# Patient Record
Sex: FEMALE | Race: WHITE | NOT HISPANIC OR LATINO | Employment: FULL TIME | URBAN - METROPOLITAN AREA
[De-identification: names, ages, dates, MRNs, and addresses within clinical notes are randomized per-mention and may not be internally consistent; named-entity substitution may affect disease eponyms.]

---

## 2023-03-14 ENCOUNTER — OFFICE VISIT (OUTPATIENT)
Dept: PRIMARY CARE | Facility: CLINIC | Age: 33
End: 2023-03-14
Payer: COMMERCIAL

## 2023-03-14 VITALS
HEIGHT: 64 IN | WEIGHT: 246.8 LBS | HEART RATE: 88 BPM | OXYGEN SATURATION: 98 % | DIASTOLIC BLOOD PRESSURE: 82 MMHG | BODY MASS INDEX: 42.14 KG/M2 | SYSTOLIC BLOOD PRESSURE: 108 MMHG

## 2023-03-14 DIAGNOSIS — R19.4 CHANGE IN BOWEL HABIT: ICD-10-CM

## 2023-03-14 DIAGNOSIS — Z86.39 HISTORY OF HYPOTHYROIDISM: ICD-10-CM

## 2023-03-14 DIAGNOSIS — Z13.220 LIPID SCREENING: Primary | ICD-10-CM

## 2023-03-14 DIAGNOSIS — Z11.4 ENCOUNTER FOR SCREENING FOR HUMAN IMMUNODEFICIENCY VIRUS (HIV): ICD-10-CM

## 2023-03-14 DIAGNOSIS — Z11.59 ENCOUNTER FOR HEPATITIS C SCREENING TEST FOR LOW RISK PATIENT: ICD-10-CM

## 2023-03-14 DIAGNOSIS — Z86.19 H/O COLD SORES: ICD-10-CM

## 2023-03-14 PROCEDURE — 99214 OFFICE O/P EST MOD 30 MIN: CPT | Performed by: PHYSICIAN ASSISTANT

## 2023-03-14 PROCEDURE — 3008F BODY MASS INDEX DOCD: CPT | Performed by: PHYSICIAN ASSISTANT

## 2023-03-14 RX ORDER — VALACYCLOVIR HYDROCHLORIDE 500 MG/1
500 TABLET, FILM COATED ORAL DAILY
Qty: 90 TABLET | Refills: 0 | Status: SHIPPED | OUTPATIENT
Start: 2023-03-14 | End: 2024-02-26 | Stop reason: SDUPTHER

## 2023-03-14 RX ORDER — LEVONORGESTREL 52 MG/1
INTRAUTERINE DEVICE INTRAUTERINE
COMMUNITY

## 2023-03-14 ASSESSMENT — PATIENT HEALTH QUESTIONNAIRE - PHQ9
SUM OF ALL RESPONSES TO PHQ QUESTIONS 1-9: 4
SUM OF ALL RESPONSES TO PHQ9 QUESTIONS 1 & 2: 0

## 2023-03-14 NOTE — ASSESSMENT & PLAN NOTE
Previous history of, never able to get her levothyroxine right  Off meds for some time  Checking TSH with reflex T4

## 2023-03-14 NOTE — PROGRESS NOTES
I was immediately available to provide supervision and direction for the care of the patient.    Federica Manning DO

## 2023-03-14 NOTE — PROGRESS NOTES
[facility]      Reason for visit:   Chief Complaint   Patient presents with   • Establish Care     Hypothyroidism, cold sores more often, IBS concerns      Yudith Perez is a 32 y.o. female who presents for     Patient presents to establish care.  She has a few issues she would like to discuss   she has cold sores that were intermittent, seem to be occurring more frequently, would like to consider daily medications to avoid having the cold sores on her lips    She was diagnosed with hypothyroidism years ago, was put on medication, had to keep increasing the dose and that was she was taking appropriately never got to goal TSH.  So she just stopped all medications.  She reports that she has always been mildly constipated, and has had problems with weight and hair and skin and nails.    Patient also reports that generally she is constipated but has noticed over the last 6 months that she has had looser stools with some abdominal cramping.  She cannot localize any of the abdominal cramping, and she denies any bloody stools.  She has been working on changing her diet, has tried dairy free and gluten-free and has noticed some improvement.  Her only concern is that her mom has Crohn's disease and she is unsure if this could potentially be Crohn's            Past Medical History:   Diagnosis Date   • Hyperthyroidism        History reviewed. No pertinent surgical history.    Social History     Tobacco Use   • Smoking status: Never     Passive exposure: Never   • Smokeless tobacco: Never   Substance Use Topics   • Alcohol use: Yes     Alcohol/week: 1.0 - 2.0 standard drink of alcohol     Types: 1 - 2 Glasses of wine per week   • Drug use: Never       Family History   Problem Relation Age of Onset   • Crohn's disease Biological Mother    • Thyroid disease Biological Mother    • Hypertension Biological Father    • ADD / ADHD Biological Sister    • No Known Problems Maternal Grandfather    • Retinitis pigmentosa Paternal  "Grandmother    • Cancer Paternal Grandfather 93        unsure of type       Patient has no known allergies.      Current Outpatient Medications:   •  levonorgestreL (MIRENA) 18.6 mcg/24 hours (5 yrs) 52 mg intrauterine device, Mirena 21 mcg/24 hours (8 yrs) 52 mg intrauterine device  1 device inserted, Disp: , Rfl:   •  valACYclovir (VALTREX) 500 mg tablet, Take 1 tablet (500 mg total) by mouth daily., Disp: 90 tablet, Rfl: 0    Review of Systems   All other systems reviewed and are negative.      Objective     Wt Readings from Last 3 Encounters:   03/14/23 112 kg (246 lb 12.8 oz)       Temp Readings from Last 3 Encounters:   No data found for Temp       BP Readings from Last 3 Encounters:   03/14/23 108/82       Pulse Readings from Last 3 Encounters:   03/14/23 88     Vitals:    03/14/23 0811   BP: 108/82   BP Location: Left upper arm   Patient Position: Sitting   Pulse: 88   SpO2: 98%   Weight: 112 kg (246 lb 12.8 oz)   Height: 1.626 m (5' 4\")     Body mass index is 42.36 kg/m².    Physical Exam  Vitals and nursing note reviewed.   HENT:      Head: Normocephalic.      Right Ear: Tympanic membrane normal.      Left Ear: Tympanic membrane normal.      Nose: Nose normal.   Eyes:      Conjunctiva/sclera: Conjunctivae normal.      Pupils: Pupils are equal, round, and reactive to light.   Cardiovascular:      Rate and Rhythm: Normal rate and regular rhythm.      Pulses: Normal pulses.      Heart sounds: Normal heart sounds.   Pulmonary:      Effort: Pulmonary effort is normal.      Breath sounds: Normal breath sounds.   Abdominal:      General: Abdomen is flat. Bowel sounds are normal.      Palpations: Abdomen is soft.   Musculoskeletal:         General: Normal range of motion.      Cervical back: Normal range of motion.   Skin:     General: Skin is warm and dry.      Capillary Refill: Capillary refill takes less than 2 seconds.   Neurological:      General: No focal deficit present.      Mental Status: She is alert and " oriented to person, place, and time.   Psychiatric:         Mood and Affect: Mood normal.         Behavior: Behavior normal.         No results found for: WBC, HGB, HCT, PLT, CHOL, TRIG, HDL, LDLDIRECT, ALT, AST, NA, K, CL, CREATININE, BUN, CO2, TSH, PSA, INR, HGBA1C, MICROALBUR     PHQ 2 to 9:  Will the patient answer the depression questions?: Y    Little interest or pleasure in doing things: Not at all    Feeling down, depressed, or hopeless: Not at all    Depression Risk: 0         SANJAY-7  Feeling nervous, anxious or on edge: 1-->Several days        Not being able to stop or control worryin-->Several days    Worrying too much about different things: 1-->Several days    Trouble relaxin-->Not at all    Being so restless that it is hard to sit still: 1-->Several days    Becoming easily annoyed or irritable: 0-->Not at all    Feeling afraid as if something awful might happen: 0-->Not at all      GAD7 Total Score: : 4      If you checked off any problems, how difficult have these made it for you to do your work, take care of things at home, or get along with other people?: Not difficult at all                       Assessment   Problem List Items Addressed This Visit        Other    Change in bowel habit     No red flag symptoms, and her exam is normal, do not suspect inflammatory bowel  Encourage patient to do a full 2 weeks of dairy free then gluten-free dairy free does not work, encouraged her to do this after she gets her fasting blood work with a celiac panel  Follow-up here in 3 months for physical         Relevant Orders    CBC and differential    Sedimentation rate, automated    C-reactive protein    Celiac reflex panel    Lactoferrin, Quant, Stool    Calprotectin, Fecal    History of hypothyroidism     Previous history of, never able to get her levothyroxine right  Off meds for some time  Checking TSH with reflex T4         Relevant Orders    Celiac reflex panel    TSH w reflex FT4    Lactoferrin,  Quant, Stool    Calprotectin, Fecal    H/O cold sores     Used to be more intermittent, seem to be occurring more frequently  Starting suppressive medication         Relevant Medications    valACYclovir (VALTREX) 500 mg tablet   Other Visit Diagnoses     Lipid screening    -  Primary    Relevant Orders    Lipid panel    Comprehensive metabolic panel    Encounter for hepatitis C screening test for low risk patient        Relevant Orders    Hepatitis C antibody    Encounter for screening for human immunodeficiency virus (HIV)        Relevant Orders    HIV 1,2 AB P24 AG                This note was written with the assistance of voice recognition software, please excuse errors associated with voice recognition software.  FACUNDO Diana C  3/14/2023

## 2023-03-14 NOTE — PATIENT INSTRUCTIONS
After you get fasting blood work, do below  I want you to stop dairy for two weeks and if diarrhea and abdominal bloating resolve, you can either stay off dairy or you may add dairy ease prior to eating dairy    If after you have stopped dairy for two weeks and your symptoms have not resolved, stop gluten(wheat, rye and barley) for two weeks.  If symptoms resolve, you need to avoid gluten, there is no pill to take before eating gluten

## 2023-03-14 NOTE — ASSESSMENT & PLAN NOTE
Used to be more intermittent, seem to be occurring more frequently  Starting suppressive medication

## 2023-03-14 NOTE — ASSESSMENT & PLAN NOTE
No red flag symptoms, and her exam is normal, do not suspect inflammatory bowel  Encourage patient to do a full 2 weeks of dairy free then gluten-free dairy free does not work, encouraged her to do this after she gets her fasting blood work with a celiac panel  Follow-up here in 3 months for physical

## 2023-04-04 ENCOUNTER — TELEPHONE (OUTPATIENT)
Dept: PRIMARY CARE | Facility: CLINIC | Age: 33
End: 2023-04-04
Payer: COMMERCIAL

## 2023-04-04 DIAGNOSIS — E03.1 CONGENITAL HYPOTHYROIDISM WITHOUT GOITER: Primary | ICD-10-CM

## 2023-04-04 LAB
ALBUMIN SERPL-MCNC: 4.6 G/DL (ref 3.9–5)
ALBUMIN/GLOB SERPL: 1.7 {RATIO} (ref 1.2–2.2)
ALP SERPL-CCNC: 84 IU/L (ref 44–121)
ALT SERPL-CCNC: 52 IU/L (ref 0–32)
AST SERPL-CCNC: 30 IU/L (ref 0–40)
BASOPHILS # BLD AUTO: 0 X10E3/UL (ref 0–0.2)
BASOPHILS NFR BLD AUTO: 0 %
BILIRUB SERPL-MCNC: 0.4 MG/DL (ref 0–1.2)
BUN SERPL-MCNC: 12 MG/DL (ref 6–20)
BUN/CREAT SERPL: 18 (ref 9–23)
CALCIUM SERPL-MCNC: 9.5 MG/DL (ref 8.7–10.2)
CHLORIDE SERPL-SCNC: 101 MMOL/L (ref 96–106)
CHOLEST SERPL-MCNC: 206 MG/DL (ref 100–199)
CO2 SERPL-SCNC: 22 MMOL/L (ref 20–29)
CREAT SERPL-MCNC: 0.65 MG/DL (ref 0.57–1)
CRP SERPL-MCNC: 8 MG/L (ref 0–10)
EGFRCR SERPLBLD CKD-EPI 2021: 121 ML/MIN/1.73
ENDOMYSIUM IGA SER QL: NEGATIVE
EOSINOPHIL # BLD AUTO: 0.1 X10E3/UL (ref 0–0.4)
EOSINOPHIL NFR BLD AUTO: 2 %
ERYTHROCYTE [DISTWIDTH] IN BLOOD BY AUTOMATED COUNT: 12.7 % (ref 11.7–15.4)
ERYTHROCYTE [SEDIMENTATION RATE] IN BLOOD BY WESTERGREN METHOD: 45 MM/HR (ref 0–32)
GLOBULIN SER CALC-MCNC: 2.7 G/DL (ref 1.5–4.5)
GLUCOSE SERPL-MCNC: 93 MG/DL (ref 70–99)
HCT VFR BLD AUTO: 41.2 % (ref 34–46.6)
HCV IGG SERPL QL IA: NON REACTIVE
HDLC SERPL-MCNC: 38 MG/DL
HGB BLD-MCNC: 14.1 G/DL (ref 11.1–15.9)
HIV 1+2 AB+HIV1 P24 AG SERPL QL IA: NON REACTIVE
IGA SERPL-MCNC: 417 MG/DL (ref 87–352)
IMM GRANULOCYTES # BLD AUTO: 0 X10E3/UL (ref 0–0.1)
IMM GRANULOCYTES NFR BLD AUTO: 0 %
LDLC SERPL CALC-MCNC: 141 MG/DL (ref 0–99)
LYMPHOCYTES # BLD AUTO: 2.5 X10E3/UL (ref 0.7–3.1)
LYMPHOCYTES NFR BLD AUTO: 32 %
MCH RBC QN AUTO: 29.2 PG (ref 26.6–33)
MCHC RBC AUTO-ENTMCNC: 34.2 G/DL (ref 31.5–35.7)
MCV RBC AUTO: 85 FL (ref 79–97)
MONOCYTES # BLD AUTO: 0.5 X10E3/UL (ref 0.1–0.9)
MONOCYTES NFR BLD AUTO: 6 %
NEUTROPHILS # BLD AUTO: 4.7 X10E3/UL (ref 1.4–7)
NEUTROPHILS NFR BLD AUTO: 60 %
PLATELET # BLD AUTO: 319 X10E3/UL (ref 150–450)
POTASSIUM SERPL-SCNC: 4.2 MMOL/L (ref 3.5–5.2)
PROT SERPL-MCNC: 7.3 G/DL (ref 6–8.5)
RBC # BLD AUTO: 4.83 X10E6/UL (ref 3.77–5.28)
SODIUM SERPL-SCNC: 138 MMOL/L (ref 134–144)
SPECIMEN STATUS: NORMAL
T4 FREE SERPL-MCNC: 0.75 NG/DL (ref 0.82–1.77)
TRIGL SERPL-MCNC: 150 MG/DL (ref 0–149)
TSH SERPL DL<=0.005 MIU/L-ACNC: 20.9 UIU/ML (ref 0.45–4.5)
TTG IGA SER-ACNC: <2 U/ML (ref 0–3)
VLDLC SERPL CALC-MCNC: 27 MG/DL (ref 5–40)
WBC # BLD AUTO: 7.9 X10E3/UL (ref 3.4–10.8)

## 2023-04-04 RX ORDER — LEVOTHYROXINE SODIUM 25 UG/1
25 TABLET ORAL
Qty: 90 TABLET | Refills: 0 | Status: SHIPPED | OUTPATIENT
Start: 2023-04-04 | End: 2023-05-04

## 2023-04-04 NOTE — RESULT ENCOUNTER NOTE
CBC is normal  CMP with elevated ALT at 52  FLP with  and non-HDL is 168  HIV and hepatitis C are negative  TSH 20.9 with T4 0.75(not on synthroid)  Sed rate elevated, C-reactive protein is normal  Awaiting stool cultures for inflammatory bowel    Can you please call patient and let her know that her thyroid function is very underactive I am going to start her on a low-dose of thyroid medicine and after 2 weeks I would like her to increase that medication to 2 pills daily.  Make sure she is taking the thyroid medicine on an empty stomach 30 minutes prior to any other food drink or medications  Also let her know that her 1 inflammatory marker is elevated, I am awaiting her stool cultures.  Would like to have her follow-up sooner than June with these results so see if she can schedule in early May please  Ivy

## 2023-04-04 NOTE — TELEPHONE ENCOUNTER
----- Message from FACUNDO Diana sent at 4/4/2023 11:08 AM EDT -----  CBC is normal  CMP with elevated ALT at 52  FLP with  and non-HDL is 168  HIV and hepatitis C are negative  TSH 20.9 with T4 0.75(not on synthroid)  Sed rate elevated, C-reactive protein is normal  Awaiting stool cultures for inflammatory bowel    Can you please call patient and let her know that her thyroid function is very underactive I am going to start her on a low-dose of thyroid medicine and after 2 weeks I would like her to increase that medication to 2 pills daily.  Make sure she is taking the thyroid medicine on an empty stomach 30 minutes prior to any other food drink or medications  Also let her know that her 1 inflammatory marker is elevated, I am awaiting her stool cultures.  Would like to have her follow-up sooner than June with these results so see if she can schedule in early May please  Ivy

## 2023-04-04 NOTE — TELEPHONE ENCOUNTER
LMOM informing pt of message in detail. Asked pt to call back to discuss if she has any questions.

## 2023-04-08 LAB — CALPROTECTIN STL-MCNT: 19 UG/G (ref 0–120)

## 2023-04-12 LAB — LACTOFERRIN STL-MCNC: <1 UG/ML(G) (ref 0–7.24)

## 2023-05-04 ENCOUNTER — OFFICE VISIT (OUTPATIENT)
Dept: PRIMARY CARE | Facility: CLINIC | Age: 33
End: 2023-05-04
Payer: COMMERCIAL

## 2023-05-04 VITALS
OXYGEN SATURATION: 98 % | DIASTOLIC BLOOD PRESSURE: 68 MMHG | SYSTOLIC BLOOD PRESSURE: 122 MMHG | HEIGHT: 64 IN | BODY MASS INDEX: 41.38 KG/M2 | HEART RATE: 72 BPM | WEIGHT: 242.4 LBS

## 2023-05-04 DIAGNOSIS — R19.4 CHANGE IN BOWEL HABIT: ICD-10-CM

## 2023-05-04 DIAGNOSIS — Z86.39 HISTORY OF HYPOTHYROIDISM: Primary | ICD-10-CM

## 2023-05-04 DIAGNOSIS — R79.82 ELEVATED C-REACTIVE PROTEIN (CRP): ICD-10-CM

## 2023-05-04 DIAGNOSIS — Z00.00 PHYSICAL EXAM: ICD-10-CM

## 2023-05-04 DIAGNOSIS — R70.0 ELEVATED SED RATE: ICD-10-CM

## 2023-05-04 DIAGNOSIS — Z23 NEED FOR DIPHTHERIA-TETANUS-PERTUSSIS (TDAP) VACCINE: ICD-10-CM

## 2023-05-04 PROCEDURE — 90471 IMMUNIZATION ADMIN: CPT | Performed by: PHYSICIAN ASSISTANT

## 2023-05-04 PROCEDURE — 90715 TDAP VACCINE 7 YRS/> IM: CPT | Performed by: PHYSICIAN ASSISTANT

## 2023-05-04 PROCEDURE — 99395 PREV VISIT EST AGE 18-39: CPT | Mod: 25 | Performed by: PHYSICIAN ASSISTANT

## 2023-05-04 PROCEDURE — 3008F BODY MASS INDEX DOCD: CPT | Performed by: PHYSICIAN ASSISTANT

## 2023-05-04 RX ORDER — LEVOTHYROXINE SODIUM 75 UG/1
75 TABLET ORAL
Qty: 90 TABLET | Refills: 3 | Status: SHIPPED | OUTPATIENT
Start: 2023-05-04 | End: 2023-11-02

## 2023-05-04 NOTE — ASSESSMENT & PLAN NOTE
Performed today, patient is now up-to-date on immunizations  Labs reviewed today  Up-to-date on GYN care

## 2023-05-04 NOTE — ASSESSMENT & PLAN NOTE
TSH 20 with T4 of 0.75, was on no medications at that time, have slowly ramped her up from 25 mcg to 50 mcg, increasing her dose to 75 mcg today  Repeat TSH with reflex T4 in 4 weeks

## 2023-05-04 NOTE — PROGRESS NOTES
[facility] in [place] [address]  [facility] [address]  Phone: [phone]  Fax: [phone]       CHIEF COMPLAINT   Annual Physical Exam  Chief Complaint   Patient presents with   • Annual Exam        HISTORY OF PRESENT ILLNESS      This is a 32 y.o. female who presents for an annual physical exam.  Patient has started on her levothyroxine with her underactive thyroid labs, initially started at 25 mcg, increased to 50 mcg, reports she is feeling a little less fatigued but thought that that might just be due to the weather at this point.  Denies any other changes, still has weight gain.  His last visit patient has been working on a dairy then a gluten-free diet, noticed improvement both in her bloating and loose stools with cessation of dairy, but also wants to try to get rid of gluten and see if that will help as well.        PAST MEDICAL AND SURGICAL HISTORY      Past Medical History:   Diagnosis Date   • Hyperthyroidism        History reviewed. No pertinent surgical history.    MEDICATIONS        Current Outpatient Medications:   •  levonorgestreL (MIRENA) 18.6 mcg/24 hours (5 yrs) 52 mg intrauterine device, Mirena 21 mcg/24 hours (8 yrs) 52 mg intrauterine device  1 device inserted, Disp: , Rfl:   •  levothyroxine (SYNTHROID) 75 mcg tablet, Take 1 tablet (75 mcg total) by mouth daily., Disp: 90 tablet, Rfl: 3  •  valACYclovir (VALTREX) 500 mg tablet, Take 1 tablet (500 mg total) by mouth daily., Disp: 90 tablet, Rfl: 0    ALLERGIES      Patient has no known allergies.    FAMILY HISTORY      Family History   Problem Relation Age of Onset   • Crohn's disease Biological Mother    • Thyroid disease Biological Mother    • Hypertension Biological Father    • ADD / ADHD Biological Sister    • No Known Problems Maternal Grandfather    • Retinitis pigmentosa Paternal Grandmother    • Cancer Paternal Grandfather 93        unsure of type       SOCIAL HISTORY      Social History  "    Socioeconomic History   • Marital status: Single     Spouse name: None   • Number of children: None   • Years of education: None   • Highest education level: None   Occupational History   • Occupation:    Tobacco Use   • Smoking status: Never     Passive exposure: Never   • Smokeless tobacco: Never   Substance and Sexual Activity   • Alcohol use: Yes     Alcohol/week: 1.0 - 2.0 standard drink of alcohol     Types: 1 - 2 Glasses of wine per week   • Drug use: Never   • Sexual activity: Yes     Partners: Male     Birth control/protection: I.U.D.   Social History Narrative    Lives alone       Lives with: alone  IPV screen: safe  Employment:  Menses: started at 14 now no menstruation with IUD  Pregnancy history: G1  Diet: dairy free helped, inconsistent, working on portion sizes  Exercise: 1-2 days a week, walking  Sleep: 6-7  Stress:moderate  Dentist: 2023  Eye exam:2023 wears glasses    Seatbelt use: yes  Smoke detector: yes  CO detector: yes  Fire extinguisher: yes    DEPRESSION/ANXIETY SCREENING       REVIEW OF SYSTEMS      Review of Systems   All other systems reviewed and are negative.      PHYSICAL EXAMINATION      Visit Vitals  /68 (BP Location: Left upper arm, Patient Position: Sitting)   Pulse 72   Ht 1.626 m (5' 4\")   Wt 110 kg (242 lb 6.4 oz)   SpO2 98%   BMI 41.61 kg/m²     Body mass index is 41.61 kg/m².  BP Readings from Last 3 Encounters:   05/04/23 122/68   03/14/23 108/82     Wt Readings from Last 3 Encounters:   05/04/23 110 kg (242 lb 6.4 oz)   03/14/23 112 kg (246 lb 12.8 oz)     Ht Readings from Last 3 Encounters:   05/04/23 1.626 m (5' 4\")   03/14/23 1.626 m (5' 4\")       Physical Exam  Vitals and nursing note reviewed.   HENT:      Right Ear: Tympanic membrane normal.      Left Ear: Tympanic membrane normal.      Mouth/Throat:      Mouth: Mucous membranes are moist.   Eyes:      Extraocular Movements: Extraocular movements intact.      Conjunctiva/sclera: Conjunctivae " normal.      Pupils: Pupils are equal, round, and reactive to light.   Cardiovascular:      Rate and Rhythm: Normal rate and regular rhythm.      Pulses: Normal pulses.      Heart sounds: Normal heart sounds.   Pulmonary:      Effort: Pulmonary effort is normal.      Breath sounds: Normal breath sounds.   Abdominal:      General: Abdomen is flat. Bowel sounds are normal.      Palpations: Abdomen is soft.   Musculoskeletal:         General: Normal range of motion.      Cervical back: Normal range of motion.   Skin:     General: Skin is warm and dry.      Capillary Refill: Capillary refill takes less than 2 seconds.   Neurological:      General: No focal deficit present.      Mental Status: She is alert and oriented to person, place, and time.   Psychiatric:         Mood and Affect: Mood normal.         Behavior: Behavior normal.         Thought Content: Thought content normal.         Judgment: Judgment normal.         LABS / IMAGING / STUDIES        Labs    Lab Results   Component Value Date    CREATININE 0.65 04/03/2023     Lab Results   Component Value Date    WBC 7.9 04/03/2023    HGB 14.1 04/03/2023    HCT 41.2 04/03/2023    MCV 85 04/03/2023     04/03/2023         No results found for: HGBA1C  No results found for: MICROALBUR, [id number]        HEALTH MAINTENANCE           Pap Smear:2023  Mammogram:na  Colonoscopy:na  LDCT:na  DEXA:na  AAA Screen:na  Podiatrist:na  Ophthalmology:2023    Immunizations  Tdap:due today, giving today  HPV: at 18 years old three shot series  Flu:season  Shingrix:na  prevnar 20:na  COVID:needs bivalent- encouraged    Labs  Routine Labs:4/2023  HCV Screen:4/2023  HIV Screen:4/2023         ASSESSMENT AND PLAN   Problem List Items Addressed This Visit        Hematologic    Elevated sed rate     45  We will repeat after thyroid has been replenished with normal TSH T4  CRP was normal  Calprotectin and lactoferrin were normal  Does have a mom with Crohn's disease         Relevant  Orders    Sedimentation rate, automated       Other    Change in bowel habit     Calprotectin was normal  Lactoferrin was normal  Celiac was negative  Sed rate was elevated at 45  CRP was normal  Patient had improvement with dairy free diet for 2 weeks, encouraged her use dairy ease, if symptoms do not completely resolve she will see GI with her mom's history of Crohn's disease           History of hypothyroidism - Primary     TSH 20 with T4 of 0.75, was on no medications at that time, have slowly ramped her up from 25 mcg to 50 mcg, increasing her dose to 75 mcg today  Repeat TSH with reflex T4 in 4 weeks         Relevant Medications    levothyroxine (SYNTHROID) 75 mcg tablet    Other Relevant Orders    TSH w reflex FT4    Physical exam     Performed today, patient is now up-to-date on immunizations  Labs reviewed today  Up-to-date on GYN care           Need for diphtheria-tetanus-pertussis (Tdap) vaccine     Administered today         Relevant Orders    Tdap vaccine greater than or equal to 6yo IM (Completed)    RESOLVED: Elevated C-reactive protein (CRP)    Relevant Orders    C-reactive protein       Health Care Maintenance:    Patient encouraged to increase activity gradually as tolerated with a goal of 150 minutes of aerobic activity per week with 2 sessions of weight training weekly.     Counseled patient on healthy eating.     Advised patient to complete regular dental examinations, brushing and flossing daily.    Encouraged enhanced safety by wearing seat belts, checking smoke and CO detectors.      Patient Instructions   Since dairy elimination has helped please use dairy ease before dairy  Continue to work off gluten free diet  If loose stools donot go away or bloating persists go see GI with moms history  Please call [facility] [phone] to schedule an appointment for your colonoscopy and or endoscopy     Get tsh in 4 weeks  Get crp once we get tsh in normal range         Ivy  FACUNDO Pereira  05/04/23

## 2023-05-04 NOTE — ASSESSMENT & PLAN NOTE
Calprotectin was normal  Lactoferrin was normal  Celiac was negative  Sed rate was elevated at 45  CRP was normal  Patient had improvement with dairy free diet for 2 weeks, encouraged her use dairy ease, if symptoms do not completely resolve she will see GI with her mom's history of Crohn's disease

## 2023-05-04 NOTE — ASSESSMENT & PLAN NOTE
45  We will repeat after thyroid has been replenished with normal TSH T4  CRP was normal  Calprotectin and lactoferrin were normal  Does have a mom with Crohn's disease

## 2023-10-30 ENCOUNTER — TELEPHONE (OUTPATIENT)
Dept: PRIMARY CARE | Facility: CLINIC | Age: 33
End: 2023-10-30
Payer: COMMERCIAL

## 2023-10-30 NOTE — TELEPHONE ENCOUNTER
Left patient a detailed msg that she has labs that were ordered on 5/4 to [facility].   I requested that patient complete them this week to have completed for her appt on 11/6/2023

## 2023-11-02 LAB
CRP SERPL-MCNC: 19.2 MG/L
ERYTHROCYTE [SEDIMENTATION RATE] IN BLOOD BY WESTERGREN METHOD: 31 MM/H
T4 FREE SERPL-MCNC: 1.1 NG/DL (ref 0.8–1.8)
TSH SERPL-ACNC: 14.39 MIU/L

## 2023-11-02 RX ORDER — LEVOTHYROXINE SODIUM 100 UG/1
100 TABLET ORAL
Qty: 30 TABLET | Refills: 0 | Status: SHIPPED | OUTPATIENT
Start: 2023-11-02 | End: 2023-11-06

## 2023-11-02 NOTE — RESULT ENCOUNTER NOTE
TSH 14.39 on 75 mcg of levothyroxine,  C-reactive protein and sed rate are still elevated but improved

## 2023-11-06 ENCOUNTER — OFFICE VISIT (OUTPATIENT)
Dept: PRIMARY CARE | Facility: CLINIC | Age: 33
End: 2023-11-06
Payer: COMMERCIAL

## 2023-11-06 VITALS
SYSTOLIC BLOOD PRESSURE: 118 MMHG | OXYGEN SATURATION: 96 % | DIASTOLIC BLOOD PRESSURE: 70 MMHG | HEART RATE: 89 BPM | WEIGHT: 256.2 LBS | HEIGHT: 64 IN | BODY MASS INDEX: 43.74 KG/M2

## 2023-11-06 DIAGNOSIS — Z86.39 HISTORY OF HYPOTHYROIDISM: ICD-10-CM

## 2023-11-06 DIAGNOSIS — E03.8 OTHER SPECIFIED HYPOTHYROIDISM: ICD-10-CM

## 2023-11-06 DIAGNOSIS — R19.4 CHANGE IN BOWEL HABIT: ICD-10-CM

## 2023-11-06 DIAGNOSIS — R70.0 ELEVATED SED RATE: ICD-10-CM

## 2023-11-06 DIAGNOSIS — Z23 NEEDS FLU SHOT: ICD-10-CM

## 2023-11-06 DIAGNOSIS — Z86.19 H/O COLD SORES: Primary | ICD-10-CM

## 2023-11-06 PROCEDURE — 90686 IIV4 VACC NO PRSV 0.5 ML IM: CPT | Performed by: PHYSICIAN ASSISTANT

## 2023-11-06 PROCEDURE — 3008F BODY MASS INDEX DOCD: CPT | Performed by: PHYSICIAN ASSISTANT

## 2023-11-06 PROCEDURE — 99213 OFFICE O/P EST LOW 20 MIN: CPT | Mod: 25 | Performed by: PHYSICIAN ASSISTANT

## 2023-11-06 PROCEDURE — 90471 IMMUNIZATION ADMIN: CPT | Performed by: PHYSICIAN ASSISTANT

## 2023-11-06 RX ORDER — LEVOTHYROXINE SODIUM 125 UG/1
125 TABLET ORAL
Qty: 30 TABLET | Refills: 0 | Status: SHIPPED | OUTPATIENT
Start: 2023-11-06 | End: 2023-12-20 | Stop reason: SDUPTHER

## 2023-11-06 NOTE — PROGRESS NOTES
[facility]      Reason for visit:   Chief Complaint   Patient presents with    Follow-up      Yudith Perez is a 33 y.o. female who presents for     Patient presents for follow-up hypothyroidism, has been on levothyroxine, TSH has improved but she is still with weighted TSH, level of 14, previous was 20, was on 88 mcg have increased in last week to 100 mcg.  She reports she feels better just being on thyroid medications, now able to get up and go about her day, does not have ongoing fatigue anymore      Also here to follow-up on her bowel changes, did a full work-up previously, including lactoferrin and calprotectin which were negative, she had a mild elevation in sed rate of 45, I have repeated that sed rate and it is now 31, also of asked patient to make changes in her diet, she has found that her abdomen does not bloat and she has no diarrhea if she follows a minimal gluten and minimal dairy diet, she has had no blood in her stool, she does not feel the need to follow-up with GI at this point.          Past Medical History:   Diagnosis Date    Hyperthyroidism        History reviewed. No pertinent surgical history.    Social History     Tobacco Use    Smoking status: Never     Passive exposure: Never    Smokeless tobacco: Never   Substance Use Topics    Alcohol use: Yes     Alcohol/week: 1.0 - 2.0 standard drink of alcohol     Types: 1 - 2 Glasses of wine per week    Drug use: Never       Family History   Problem Relation Age of Onset    Crohn's disease Biological Mother     Thyroid disease Biological Mother     Hypertension Biological Father     ADD / ADHD Biological Sister     No Known Problems Maternal Grandfather     Retinitis pigmentosa Paternal Grandmother     Cancer Paternal Grandfather 93        unsure of type       Patient has no known allergies.      Current Outpatient Medications:     levonorgestreL (MIRENA) 18.6 mcg/24 hours (5 yrs) 52 mg intrauterine device, Mirena 21 mcg/24 hours (8  "yrs) 52 mg intrauterine device  1 device inserted, Disp: , Rfl:     levothyroxine (SYNTHROID) 125 mcg tablet, Take 1 tablet (125 mcg total) by mouth daily., Disp: 30 tablet, Rfl: 0    valACYclovir (VALTREX) 500 mg tablet, Take 1 tablet (500 mg total) by mouth daily., Disp: 90 tablet, Rfl: 0    Review of Systems   All other systems reviewed and are negative.      Objective     Wt Readings from Last 3 Encounters:   11/06/23 116 kg (256 lb 3.2 oz)   05/04/23 110 kg (242 lb 6.4 oz)   03/14/23 112 kg (246 lb 12.8 oz)       Temp Readings from Last 3 Encounters:   No data found for Temp       BP Readings from Last 3 Encounters:   11/06/23 118/70   05/04/23 122/68   03/14/23 108/82       Pulse Readings from Last 3 Encounters:   11/06/23 89   05/04/23 72   03/14/23 88     Vitals:    11/06/23 0744   BP: 118/70   BP Location: Right upper arm   Patient Position: Sitting   Pulse: 89   SpO2: 96%   Weight: 116 kg (256 lb 3.2 oz)   Height: 1.626 m (5' 4\")     Body mass index is 43.98 kg/m².    Physical Exam  Vitals and nursing note reviewed.   HENT:      Head: Normocephalic.      Nose: Nose normal.   Eyes:      Conjunctiva/sclera: Conjunctivae normal.      Pupils: Pupils are equal, round, and reactive to light.   Cardiovascular:      Rate and Rhythm: Normal rate and regular rhythm.      Pulses: Normal pulses.      Heart sounds: Normal heart sounds.   Pulmonary:      Effort: Pulmonary effort is normal.      Breath sounds: Normal breath sounds.   Abdominal:      General: Abdomen is flat. Bowel sounds are normal.      Palpations: Abdomen is soft.   Musculoskeletal:         General: Normal range of motion.      Cervical back: Normal range of motion.   Skin:     General: Skin is warm and dry.      Capillary Refill: Capillary refill takes less than 2 seconds.   Neurological:      General: No focal deficit present.      Mental Status: She is alert and oriented to person, place, and time.   Psychiatric:         Mood and Affect: Mood " normal.         Behavior: Behavior normal.         Lab Results   Component Value Date    WBC 7.9 04/03/2023    HGB 14.1 04/03/2023    HCT 41.2 04/03/2023     04/03/2023    CHOL 206 (H) 04/03/2023    TRIG 150 (H) 04/03/2023    HDL 38 (L) 04/03/2023    ALT 52 (H) 04/03/2023    AST 30 04/03/2023     04/03/2023    K 4.2 04/03/2023     04/03/2023    CREATININE 0.65 04/03/2023    BUN 12 04/03/2023    CO2 22 04/03/2023    TSH 14.39 (H) 11/01/2023                     Assessment   Problem List Items Addressed This Visit        Digestive    Change in bowel habit     With changing her diet with limited gluten and dairy her bowels have gone back to normal she has no blood in her stool  Sed rate was 45, now 31  Lactoferrin and calprotectin are negative  We will do to monitor  Patient does not want to go to GI at this point            Hematologic    Elevated sed rate     45 down to 31            Other    History of hypothyroidism     TSH 14 on levothyroxine 88 mcg increased to 100 mcg, after 2 weeks she will increase to 125 mcg and we will check labs in 2 weeks         H/O cold sores - Primary     Suppressive treatment         Other Visit Diagnoses     Other specified hypothyroidism        Relevant Medications    levothyroxine (SYNTHROID) 125 mcg tablet    Needs flu shot        Relevant Orders    Influenza vaccine quadrivalent preservative free 6 month and older IM (Completed)                This note was written with the assistance of voice recognition software, please excuse errors associated with voice recognition software.  FACUNDO Diana  11/6/2023

## 2023-11-06 NOTE — ASSESSMENT & PLAN NOTE
TSH 14 on levothyroxine 88 mcg increased to 100 mcg, after 2 weeks she will increase to 125 mcg and we will check labs in 2 weeks

## 2023-11-06 NOTE — PATIENT INSTRUCTIONS
Please increase your thyroid medicine in 2 weeks to 125mcg, then get labs two weeks after    Have a great year!!!

## 2023-11-06 NOTE — ASSESSMENT & PLAN NOTE
With changing her diet with limited gluten and dairy her bowels have gone back to normal she has no blood in her stool  Sed rate was 45, now 31  Lactoferrin and calprotectin are negative  We will do to monitor  Patient does not want to go to GI at this point

## 2023-12-19 LAB
ALBUMIN SERPL-MCNC: 4.3 G/DL (ref 3.6–5.1)
ALBUMIN/GLOB SERPL: 1.4 (CALC) (ref 1–2.5)
ALP SERPL-CCNC: 87 U/L (ref 31–125)
ALT SERPL-CCNC: 51 U/L (ref 6–29)
AST SERPL-CCNC: 27 U/L (ref 10–30)
BASOPHILS # BLD AUTO: 38 CELLS/UL (ref 0–200)
BASOPHILS NFR BLD AUTO: 0.5 %
BILIRUB SERPL-MCNC: 0.3 MG/DL (ref 0.2–1.2)
BUN SERPL-MCNC: 16 MG/DL (ref 7–25)
BUN/CREAT SERPL: ABNORMAL (CALC) (ref 6–22)
CALCIUM SERPL-MCNC: 9.2 MG/DL (ref 8.6–10.2)
CHLORIDE SERPL-SCNC: 101 MMOL/L (ref 98–110)
CHOLEST SERPL-MCNC: 194 MG/DL
CHOLEST/HDLC SERPL: 5.7 (CALC)
CO2 SERPL-SCNC: 27 MMOL/L (ref 20–32)
CREAT SERPL-MCNC: 0.68 MG/DL (ref 0.5–0.97)
CRP SERPL-MCNC: 14.3 MG/L
EGFRCR SERPLBLD CKD-EPI 2021: 119 ML/MIN/1.73M2
EOSINOPHIL # BLD AUTO: 188 CELLS/UL (ref 15–500)
EOSINOPHIL NFR BLD AUTO: 2.5 %
ERYTHROCYTE [DISTWIDTH] IN BLOOD BY AUTOMATED COUNT: 12.6 % (ref 11–15)
ERYTHROCYTE [SEDIMENTATION RATE] IN BLOOD BY WESTERGREN METHOD: 19 MM/H
GLOBULIN SER CALC-MCNC: 3.1 G/DL (CALC) (ref 1.9–3.7)
GLUCOSE SERPL-MCNC: 99 MG/DL (ref 65–139)
HCT VFR BLD AUTO: 36.9 % (ref 35–45)
HCV AB SERPL QL IA: NORMAL
HDLC SERPL-MCNC: 34 MG/DL
HGB BLD-MCNC: 12.7 G/DL (ref 11.7–15.5)
HIV 1+2 AB+HIV1 P24 AG SERPL QL IA: NORMAL
IGA SERPL-MCNC: 367 MG/DL (ref 47–310)
LABORATORY COMMENT REPORT: ABNORMAL
LDLC SERPL CALC-MCNC: 128 MG/DL (CALC)
LYMPHOCYTES # BLD AUTO: 2168 CELLS/UL (ref 850–3900)
LYMPHOCYTES NFR BLD AUTO: 28.9 %
MCH RBC QN AUTO: 29.1 PG (ref 27–33)
MCHC RBC AUTO-ENTMCNC: 34.4 G/DL (ref 32–36)
MCV RBC AUTO: 84.6 FL (ref 80–100)
MONOCYTES # BLD AUTO: 593 CELLS/UL (ref 200–950)
MONOCYTES NFR BLD AUTO: 7.9 %
NEUTROPHILS # BLD AUTO: 4515 CELLS/UL (ref 1500–7800)
NEUTROPHILS NFR BLD AUTO: 60.2 %
NONHDLC SERPL-MCNC: 160 MG/DL (CALC)
PLATELET # BLD AUTO: 338 THOUSAND/UL (ref 140–400)
PMV BLD REES-ECKER: 9.9 FL (ref 7.5–12.5)
POTASSIUM SERPL-SCNC: 3.9 MMOL/L (ref 3.5–5.3)
PROT SERPL-MCNC: 7.4 G/DL (ref 6.1–8.1)
RBC # BLD AUTO: 4.36 MILLION/UL (ref 3.8–5.1)
SODIUM SERPL-SCNC: 137 MMOL/L (ref 135–146)
TRIGL SERPL-MCNC: 188 MG/DL
TSH SERPL-ACNC: 3.51 MIU/L
TTG IGA SER-ACNC: <1 U/ML
WBC # BLD AUTO: 7.5 THOUSAND/UL (ref 3.8–10.8)

## 2024-02-26 DIAGNOSIS — Z86.19 H/O COLD SORES: ICD-10-CM

## 2024-02-27 RX ORDER — VALACYCLOVIR HYDROCHLORIDE 500 MG/1
500 TABLET, FILM COATED ORAL DAILY
Qty: 90 TABLET | Refills: 0 | Status: SHIPPED | OUTPATIENT
Start: 2024-02-27 | End: 2024-05-27

## 2024-02-27 NOTE — TELEPHONE ENCOUNTER
"Medicine Refill Request    Last Office Visit: 11/6/2023   Last Consult Visit: Visit date not found  Last Telemedicine Visit: Visit date not found    Next Appointment: 11/1/2024      Current Outpatient Medications:     levonorgestreL (MIRENA) 18.6 mcg/24 hours (5 yrs) 52 mg intrauterine device, Mirena 21 mcg/24 hours (8 yrs) 52 mg intrauterine device  1 device inserted, Disp: , Rfl:     levothyroxine (SYNTHROID) 125 mcg tablet, Take 1 tablet (125 mcg total) by mouth daily., Disp: 90 tablet, Rfl: 1    valACYclovir (VALTREX) 500 mg tablet, Take 1 tablet (500 mg total) by mouth daily., Disp: 90 tablet, Rfl: 0      BP Readings from Last 3 Encounters:   11/06/23 118/70   05/04/23 122/68   03/14/23 108/82       Recent Lab results:  Lab Results   Component Value Date    CHOL 194 12/18/2023   ,   Lab Results   Component Value Date    HDL 34 (L) 12/18/2023   ,   Lab Results   Component Value Date    LDLCALC 128 (H) 12/18/2023   ,   Lab Results   Component Value Date    TRIG 188 (H) 12/18/2023        Lab Results   Component Value Date    GLUCOSE 99 12/18/2023   , No results found for: \"HGBA1C\"      Lab Results   Component Value Date    CREATININE 0.68 12/18/2023       Lab Results   Component Value Date    TSH 3.51 12/18/2023         No results found for: \"HGBA1C\"    "

## 2024-03-04 ENCOUNTER — TRANSCRIBE ORDERS (OUTPATIENT)
Dept: REGISTRATION | Facility: CLINIC | Age: 34
End: 2024-03-04

## 2024-03-04 DIAGNOSIS — T83.32XA DISPLACEMENT OF INTRAUTERINE CONTRACEPTIVE DEVICE, INITIAL ENCOUNTER: Primary | ICD-10-CM

## 2024-03-08 ENCOUNTER — HOSPITAL ENCOUNTER (OUTPATIENT)
Dept: RADIOLOGY | Facility: CLINIC | Age: 34
Discharge: HOME | End: 2024-03-08
Attending: STUDENT IN AN ORGANIZED HEALTH CARE EDUCATION/TRAINING PROGRAM
Payer: COMMERCIAL

## 2024-03-08 DIAGNOSIS — T83.32XA DISPLACEMENT OF INTRAUTERINE CONTRACEPTIVE DEVICE, INITIAL ENCOUNTER: ICD-10-CM

## 2024-03-08 PROCEDURE — 76830 TRANSVAGINAL US NON-OB: CPT

## 2024-06-26 DIAGNOSIS — E03.8 OTHER SPECIFIED HYPOTHYROIDISM: ICD-10-CM

## 2024-06-26 RX ORDER — LEVOTHYROXINE SODIUM 125 UG/1
125 TABLET ORAL
Qty: 90 TABLET | Refills: 1 | Status: SHIPPED | OUTPATIENT
Start: 2024-06-26

## 2025-08-06 ENCOUNTER — TELEPHONE (OUTPATIENT)
Dept: PRIMARY CARE | Facility: CLINIC | Age: 35
End: 2025-08-06
Payer: COMMERCIAL

## 2025-08-06 DIAGNOSIS — R70.0 ELEVATED SED RATE: Primary | ICD-10-CM

## 2025-08-06 DIAGNOSIS — R74.01 ELEVATED ALT MEASUREMENT: ICD-10-CM

## 2025-08-06 DIAGNOSIS — Z86.39 HISTORY OF HYPOTHYROIDISM: ICD-10-CM

## 2025-08-06 DIAGNOSIS — Z13.0 SCREENING FOR DEFICIENCY ANEMIA: ICD-10-CM

## 2025-08-06 DIAGNOSIS — R39.9 UTI SYMPTOMS: ICD-10-CM

## 2025-08-06 NOTE — TELEPHONE ENCOUNTER
Pt scheduled yearly physical for 9/16/25 she is asking to have an order to have labs done before her appointment especially for her thyroid because she is trying to get pregnant. If ok pt would like orders mailed to her.